# Patient Record
Sex: MALE | Race: WHITE | Employment: OTHER | URBAN - METROPOLITAN AREA
[De-identification: names, ages, dates, MRNs, and addresses within clinical notes are randomized per-mention and may not be internally consistent; named-entity substitution may affect disease eponyms.]

---

## 2017-12-28 ENCOUNTER — OFFICE VISIT (OUTPATIENT)
Dept: FAMILY MEDICINE CLINIC | Facility: CLINIC | Age: 67
End: 2017-12-28

## 2017-12-28 VITALS
DIASTOLIC BLOOD PRESSURE: 76 MMHG | HEIGHT: 66 IN | SYSTOLIC BLOOD PRESSURE: 136 MMHG | BODY MASS INDEX: 27 KG/M2 | WEIGHT: 168 LBS | TEMPERATURE: 98 F | HEART RATE: 58 BPM

## 2017-12-28 DIAGNOSIS — Z00.00 PHYSICAL EXAM: Primary | ICD-10-CM

## 2017-12-28 DIAGNOSIS — I45.10 RBBB: ICD-10-CM

## 2017-12-28 DIAGNOSIS — Z12.11 COLON CANCER SCREENING: ICD-10-CM

## 2017-12-28 DIAGNOSIS — Z00.00 MEDICARE ANNUAL WELLNESS VISIT, SUBSEQUENT: ICD-10-CM

## 2017-12-28 DIAGNOSIS — E78.00 PURE HYPERCHOLESTEROLEMIA: ICD-10-CM

## 2017-12-28 DIAGNOSIS — H52.13 MYOPIA OF BOTH EYES: ICD-10-CM

## 2017-12-28 PROCEDURE — G0009 ADMIN PNEUMOCOCCAL VACCINE: HCPCS | Performed by: FAMILY MEDICINE

## 2017-12-28 PROCEDURE — G0439 PPPS, SUBSEQ VISIT: HCPCS | Performed by: FAMILY MEDICINE

## 2017-12-28 PROCEDURE — 90732 PPSV23 VACC 2 YRS+ SUBQ/IM: CPT | Performed by: FAMILY MEDICINE

## 2017-12-28 NOTE — PROGRESS NOTES
HPI:   Fred Syed is a 75year old male who presents for a Medicare Subsequent Annual Wellness visit (Pt already had Initial Annual Wellness). Fred Syed is a 75year old male is here for routine periodic health screening and examination. standard forms performed Face to Face with patient and Family/surrogate (if present), and forms available to patient in AVS       He does NOT have a Power of  for [employer] on file in [place].    Advance care planning including the explanation and di REVIEW OF SYSTEMS:   Review of Systems   Constitutional: Negative for appetite change, fatigue and fever. HENT: Negative for ear pain, hearing loss, nosebleeds and tinnitus. Eyes: Negative for pain and visual disturbance.    Respiratory: Negative f I have to worry about missing the telephone ring or doorbell:  No I have trouble hearing conversations in a noisy background such as a crowded room or restaurant:  No   I get confused about where sounds come from:  No I misunderstand some words in a sent heard.  Pulmonary/Chest:   Lungs clear to ascultation bilaterally. Abdominal: Soft. Normal appearance and bowel sounds are normal. He exhibits no distension and no mass. There is no splenomegaly or hepatomegaly. There is no tenderness.  Hernia confirmed n subsequent  -     CBC WITH DIFFERENTIAL WITH PLATELET; Future  -     COMP METABOLIC PANEL (14); Future  -     EKG 12-LEAD; Future  -     HEMOGLOBIN A1C; Future  -     LIPID PANEL;  Future  -     PSA TOTAL W REFLEX TO FREE  -     TSH W REFLEX TO FREE T4; Fut that includes an adequate sleep and physical exercise / activities Patient educated on doing regular self testicular exam. Patient was educated on sexual transmitted disease. Best to abstain from sexual intercourse until he is ready to form a family.  Use o Colonoscopy Screen every 10 years Colonoscopy,10 Years due on 06/13/1999 Update Health Maintenance if applicable    Flex Sigmoidoscopy Screen every 10 years No results found for this or any previous visit. No flowsheet data found.      Fecal Occult Blo

## 2017-12-29 ENCOUNTER — LAB ENCOUNTER (OUTPATIENT)
Dept: LAB | Age: 67
End: 2017-12-29
Attending: FAMILY MEDICINE
Payer: MEDICARE

## 2017-12-29 DIAGNOSIS — E78.00 PURE HYPERCHOLESTEROLEMIA: ICD-10-CM

## 2017-12-29 DIAGNOSIS — Z00.00 MEDICARE ANNUAL WELLNESS VISIT, SUBSEQUENT: ICD-10-CM

## 2017-12-29 DIAGNOSIS — Z00.00 PHYSICAL EXAM: Primary | ICD-10-CM

## 2017-12-29 DIAGNOSIS — I45.10 RBBB: ICD-10-CM

## 2017-12-29 DIAGNOSIS — Z00.00 MEDICARE ANNUAL WELLNESS VISIT, INITIAL: ICD-10-CM

## 2017-12-29 PROCEDURE — 84443 ASSAY THYROID STIM HORMONE: CPT

## 2017-12-29 PROCEDURE — 80061 LIPID PANEL: CPT

## 2017-12-29 PROCEDURE — 86803 HEPATITIS C AB TEST: CPT

## 2017-12-29 PROCEDURE — 81015 MICROSCOPIC EXAM OF URINE: CPT | Performed by: FAMILY MEDICINE

## 2017-12-29 PROCEDURE — 84153 ASSAY OF PSA TOTAL: CPT | Performed by: FAMILY MEDICINE

## 2017-12-29 PROCEDURE — 93010 ELECTROCARDIOGRAM REPORT: CPT | Performed by: FAMILY MEDICINE

## 2017-12-29 PROCEDURE — 36415 COLL VENOUS BLD VENIPUNCTURE: CPT

## 2017-12-29 PROCEDURE — 83036 HEMOGLOBIN GLYCOSYLATED A1C: CPT

## 2017-12-29 PROCEDURE — 80053 COMPREHEN METABOLIC PANEL: CPT

## 2017-12-29 PROCEDURE — 93005 ELECTROCARDIOGRAM TRACING: CPT

## 2017-12-29 PROCEDURE — 85025 COMPLETE CBC W/AUTO DIFF WBC: CPT

## 2018-01-02 LAB — HCV AB SERPL QL IA: NONREACTIVE

## 2020-07-09 ENCOUNTER — TELEPHONE (OUTPATIENT)
Dept: FAMILY MEDICINE CLINIC | Facility: CLINIC | Age: 70
End: 2020-07-09

## 2020-07-09 NOTE — TELEPHONE ENCOUNTER
Left message with pt's daughter Lizz Degroot to call us back when pt returns from [facility]. Patient is due for Medicare px with Dr. Julius Cueto.

## 2025-07-07 ENCOUNTER — HOSPITAL ENCOUNTER (OUTPATIENT)
Dept: PSYCHOLOGY | Age: 75
Discharge: STILL ADMITTED OR ADMINISTRATIVE | End: 2025-07-07
Attending: CLINICAL NEUROPSYCHOLOGIST

## 2025-07-07 DIAGNOSIS — F03.911 DEMENTIA WITH AGITATION, UNSPECIFIED DEMENTIA SEVERITY, UNSPECIFIED DEMENTIA TYPE  (CMD): Primary | ICD-10-CM

## 2025-07-07 PROCEDURE — 96116 NUBHVL XM PHYS/QHP 1ST HR: CPT | Performed by: CLINICAL NEUROPSYCHOLOGIST

## 2025-07-07 PROCEDURE — 96121 NUBHVL XM PHY/QHP EA ADDL HR: CPT | Performed by: CLINICAL NEUROPSYCHOLOGIST

## 2025-07-28 ENCOUNTER — HOSPITAL ENCOUNTER (OUTPATIENT)
Dept: PSYCHOLOGY | Age: 75
Discharge: STILL ADMITTED OR ADMINISTRATIVE | End: 2025-07-28
Attending: CLINICAL NEUROPSYCHOLOGIST

## 2025-07-28 DIAGNOSIS — F03.911 DEMENTIA WITH AGITATION, UNSPECIFIED DEMENTIA SEVERITY, UNSPECIFIED DEMENTIA TYPE  (CMD): Primary | ICD-10-CM

## (undated) NOTE — LETTER
January 2, 2018     [facility] [place]  [address]  [address]      Dear Benjamin Show:    Below are the results from your recent visit:    Resulted Orders   PSA TOTAL W REFLEX TO FREE   Result Value Ref Range    PSA 0.8 0.0 - 4.0 ng/mL

## (undated) NOTE — LETTER
December 29, 2017     [facility] [place] [place]  [address]  [address] [place]      Dear Bryce Oats:    Below are the results from your recent visit: results are within normal limits.      Resulted Orders   URINE MICROSCOPIC W REFLEX CULTURE   Result

## (undated) NOTE — LETTER
December 29, 2017     [facility]  [address]  [address]      Dear Joanna Merino:    Below are the results from your recent visit: results are within normal limits.     Resulted Orders   CBC W/ DIFFERENTIAL   Result Value Ref Range

## (undated) NOTE — LETTER
January 2, 2018     [facility] [place]  [address]  [address] [place]      Dear Mikki Sánchez:    Below are the results from your recent visit:    Resulted Orders   COMP METABOLIC PANEL (14)   Result Value Ref Range    Glucose 98 70 - 99 mg/dL RDW 13.5 11.0 - 15.0 %     140 - 400 K/UL    MPV 8.0 7.4 - 10.3 fL    Neutrophil % 71 %    Lymphocyte % 19 %    Monocyte % 9 %    Eosinophil % 1 %    Basophil % 0 %    Neutrophil Absolute 5.6 1.8 - 7.7 K/UL    Lymphocyte Absolute 1.5 1.0 - 4.0 K/UL